# Patient Record
Sex: FEMALE
[De-identification: names, ages, dates, MRNs, and addresses within clinical notes are randomized per-mention and may not be internally consistent; named-entity substitution may affect disease eponyms.]

---

## 2018-11-05 ENCOUNTER — CHARTING TRANS (OUTPATIENT)
Dept: OTHER | Age: 23
End: 2018-11-05

## 2018-11-05 ENCOUNTER — LAB SERVICES (OUTPATIENT)
Dept: OTHER | Age: 23
End: 2018-11-05

## 2018-11-05 LAB
APPEARANCE: NORMAL
BILIRUBIN: NEGATIVE
COLOR: NORMAL
GLUCOSE U: NEGATIVE
KETONES: NEGATIVE
LEUKOCYTES: NORMAL
NITRITE: NEGATIVE
OCCULT BLOOD: NORMAL
PH: 6.5
PROTEIN: NEGATIVE
URINE SPEC GRAVITY: 1.01
UROBILINOGEN: NEGATIVE

## 2018-11-05 ASSESSMENT — PAIN SCALES - GENERAL: PAINLEVEL_OUTOF10: 5

## 2018-11-08 ENCOUNTER — CHARTING TRANS (OUTPATIENT)
Dept: OTHER | Age: 23
End: 2018-11-08

## 2018-11-08 LAB — BACTERIA UR CULT: NORMAL

## 2018-12-08 VITALS
RESPIRATION RATE: 18 BRPM | BODY MASS INDEX: 21.47 KG/M2 | TEMPERATURE: 98.7 F | WEIGHT: 125.78 LBS | HEART RATE: 78 BPM | HEIGHT: 64 IN

## 2025-02-06 ENCOUNTER — OFFICE VISIT (OUTPATIENT)
Dept: OBGYN CLINIC | Facility: CLINIC | Age: 30
End: 2025-02-06
Payer: COMMERCIAL

## 2025-02-06 VITALS
DIASTOLIC BLOOD PRESSURE: 58 MMHG | BODY MASS INDEX: 28 KG/M2 | HEIGHT: 63 IN | SYSTOLIC BLOOD PRESSURE: 102 MMHG | WEIGHT: 158 LBS

## 2025-02-06 DIAGNOSIS — N92.0 MENORRHAGIA WITH REGULAR CYCLE: ICD-10-CM

## 2025-02-06 DIAGNOSIS — Z31.89 ENCOUNTER FOR FERTILITY PLANNING: Primary | ICD-10-CM

## 2025-02-06 DIAGNOSIS — Z76.89 ENCOUNTER TO ESTABLISH CARE: ICD-10-CM

## 2025-02-06 DIAGNOSIS — Z12.4 SCREENING FOR CERVICAL CANCER: ICD-10-CM

## 2025-02-06 DIAGNOSIS — N94.6 DYSMENORRHEA: ICD-10-CM

## 2025-02-06 PROBLEM — R10.2 ABDOMINAL PAIN, SUPRAPUBIC: Status: ACTIVE | Noted: 2018-09-25

## 2025-02-06 PROBLEM — N76.4 VULVAR ABSCESS: Status: ACTIVE | Noted: 2018-02-07

## 2025-02-06 PROBLEM — R21 RASH: Status: ACTIVE | Noted: 2018-02-07

## 2025-02-06 PROBLEM — K58.9 IRRITABLE BOWEL SYNDROME: Status: ACTIVE | Noted: 2020-03-03

## 2025-02-06 PROBLEM — R30.0 DYSURIA: Status: ACTIVE | Noted: 2020-02-10

## 2025-02-06 PROCEDURE — 99214 OFFICE O/P EST MOD 30 MIN: CPT | Performed by: OBSTETRICS & GYNECOLOGY

## 2025-02-06 PROCEDURE — 99385 PREV VISIT NEW AGE 18-39: CPT | Performed by: OBSTETRICS & GYNECOLOGY

## 2025-02-06 PROCEDURE — 87491 CHLMYD TRACH DNA AMP PROBE: CPT | Performed by: OBSTETRICS & GYNECOLOGY

## 2025-02-06 PROCEDURE — 88175 CYTOPATH C/V AUTO FLUID REDO: CPT | Performed by: OBSTETRICS & GYNECOLOGY

## 2025-02-06 PROCEDURE — 87624 HPV HI-RISK TYP POOLED RSLT: CPT | Performed by: OBSTETRICS & GYNECOLOGY

## 2025-02-06 PROCEDURE — 87591 N.GONORRHOEAE DNA AMP PROB: CPT | Performed by: OBSTETRICS & GYNECOLOGY

## 2025-02-06 RX ORDER — IBUPROFEN 800 MG/1
800 TABLET, FILM COATED ORAL EVERY 8 HOURS PRN
Qty: 60 TABLET | Refills: 3 | Status: SHIPPED | OUTPATIENT
Start: 2025-02-06

## 2025-02-06 RX ORDER — TRANEXAMIC ACID 650 MG/1
1300 TABLET ORAL 3 TIMES DAILY
Qty: 30 TABLET | Refills: 6 | Status: SHIPPED | OUTPATIENT
Start: 2025-02-06

## 2025-02-07 LAB
C TRACH DNA SPEC QL NAA+PROBE: NEGATIVE
HPV E6+E7 MRNA CVX QL NAA+PROBE: NEGATIVE
N GONORRHOEA DNA SPEC QL NAA+PROBE: NEGATIVE

## 2025-02-07 NOTE — PROGRESS NOTES
New Patient GYN History and Physical  [facility]    CHIEF COMPLAINT:    Chief Complaint   Patient presents with    Menstrual Problem     Pt states very bad menstrual cycles    HISTORY OF PRESENT ILLNESS:   Nohelia Cavazos is a 29 year old female   who presents for  annual exam and some concerns.    Very bad periods    And trying to get pregnant for a year    Patient's last menstrual period was 2025.  Montly / regular, 4-5 days, first 2-3 days very heavy then tapers, pain starts with with the bleeding most of the time, and resolves   Ibuprofen helps    Does get pain with ovulation - hurts for a couple days - thinks its during ovulation.    Uses a period tracking cate.    Currently partner x 11 years (he is 29 yo) - not a smoker, not a heavy alcohol, no marijuana    Last pap smear - unsure, maybe 2018 normal  Remote h/o chlamydia treated with no complications    Had a pregnancy at 15 yo - EAB with D&C    Mood: sees a therapist, thinks she has ADHD but doesn't want medication      PAST MEDICAL HISTORY:   Past Medical History:    Anxiety        PAST SURGICAL HISTORY:   Past Surgical History:   Procedure Laterality Date    Patient denies any surgical history          PAST OB HISTORY:  OB History    Para Term  AB Living   1       1 0   SAB IAB Ectopic Multiple Live Births     1            # Outcome Date GA Lbr Shelton/2nd Weight Sex Type Anes PTL Lv   1 IAB                CURRENT MEDICATIONS:      Current Outpatient Medications:     ibuprofen 800 MG Oral Tab, Take 1 tablet (800 mg total) by mouth every 8 (eight) hours as needed for Pain., Disp: 60 tablet, Rfl: 3    Prenatal Vit-Fe Fumarate-FA (PRENATAL VITAMINS OR), Take by mouth., Disp: , Rfl:     MAGNESIUM GLYCINATE OR, Take 500 mg by mouth daily., Disp: , Rfl:     tranexamic acid 650 MG Oral Tab, Take 2 tablets (1,300 mg total) by mouth in the morning, at noon, and at bedtime. Take for either 5 days max, or stop when bleeding stops,  Disp: 30 tablet, Rfl: 6    ALLERGIES:  Allergies[1]    SOCIAL HISTORY:  Social History     Socioeconomic History    Marital status: Single   Tobacco Use    Smoking status: Never    Smokeless tobacco: Never   Substance and Sexual Activity    Alcohol use: Yes     Comment: 1-2 per week    Drug use: Never    Sexual activity: Yes   Other Topics Concern    Blood Transfusions No       FAMILY HISTORY:  Family History   Problem Relation Age of Onset    Hypertension Father     No Known Problems Mother     Kidney Cancer Maternal Grandmother     Diabetes Maternal Grandfather     Diabetes Paternal Grandmother      ASSESSMENTS:  PHYSICAL EXAM:   Patient's last menstrual period was 02/05/2025.   Vitals:    02/06/25 1756   BP: 102/58   Weight: 158 lb (71.7 kg)   Height: 63\"     CONSTITUTIONAL: Awake, alert, cooperative, no apparent distress, and appears stated age   NECK: Supple, symmetrical, trachea midline, no adenopathy, thyroid symmetric, not enlarged and no tenderness  LUNGS: No excess work of breathing  CHEST/BREASTS: Breasts symmetrical, skin without lesion(s), no nipple retraction or dimpling, no nipple discharge, no masses palpated, no axillary or supraclavicular adenopathy    ABDOMEN: Soft, non-distended, non-tender, no masses palpated    GENITAL/URINARY:    External Genitalia:  General appearance; normal, Hair distribution; normal, Lesions absent   Urethral Meatus:  Lesions absent, Prolapse absent  Bladder:  Tenderness absent, Cystocele absent  Vagina:  Discharge absent, Lesions absent, Pelvic support normal  Cervix:  Lesions absent, Discharge absent, Tenderness absent  Uterus:  Size normal, Masses absent, Tenderness absent  Adnexa:  Masses absent, Tenderness absent  Anus/Perineum:  Lesions absent    MUSCULOSKELETAL: There is no redness, warmth, or swelling of the joints. Tone is normal.  NEUROLOGIC: Patient is awake, alert and oriented to name, place and time. Casual gait is normal.  SKIN: no bruising or bleeding and no  kota  PSYCHIATRIC: Behavior:  Appropriate  Mood:  appropriate  ASSESSMENT AND PLAN:  1. Encounter for fertility planning  - will check labs, collected pap, get US. If all normal, can consider OI medications, vs fertility referral.  - TSH and Free T4; Future  - Prolactin; Future  - CBC, Platelet; No Differential; Future  - LH (Luteinizing Hormone); Future  - FSH; Future  - Estradiol; Future  - Chlamydia/GC PCR Combo; Future    2. Encounter to establish care  - Primary Care Physician Referral - In Network    3. Dysmenorrhea  - ibuprofen rx sent  - Pelvic US Complete GY[facility] Only [[id number]]; Future    4. Menorrhagia with regular cycle  - TXA rx sent  - Pelvic US Complete GYNE Only [[id number]]; Future    5. Screening for cervical cancer  - ThinPrep PAP Smear; Future  - Hpv Dna  High Risk , Thin Prep Collect; Future    30 minutes was spent reviewing diagnostic and management for additioal concerns above typical annual exam / pap smear review.  follow up as needed  Mary Adrian DO         [1]   Allergies  Allergen Reactions    Amoxicillin RASH    Sulfamethoxazole W/Trimethoprim RASH

## 2025-02-08 ENCOUNTER — LAB ENCOUNTER (OUTPATIENT)
Dept: LAB | Facility: HOSPITAL | Age: 30
End: 2025-02-08
Attending: OBSTETRICS & GYNECOLOGY
Payer: COMMERCIAL

## 2025-02-08 DIAGNOSIS — Z31.89 ENCOUNTER FOR FERTILITY PLANNING: ICD-10-CM

## 2025-02-08 LAB
DEPRECATED RDW RBC AUTO: 44.5 FL (ref 35.1–46.3)
ERYTHROCYTE [DISTWIDTH] IN BLOOD BY AUTOMATED COUNT: 13.2 % (ref 11–15)
ESTRADIOL SERPL-MCNC: 39.2 PG/ML
FSH SERPL-ACNC: 9.2 MIU/ML
HCT VFR BLD AUTO: 39.8 %
HGB BLD-MCNC: 13.2 G/DL
LH SERPL-ACNC: 5.8 MIU/ML
MCH RBC QN AUTO: 29.9 PG (ref 26–34)
MCHC RBC AUTO-ENTMCNC: 33.2 G/DL (ref 31–37)
MCV RBC AUTO: 90.2 FL
PLATELET # BLD AUTO: 306 10(3)UL (ref 150–450)
PROLACTIN SERPL-MCNC: 7 NG/ML
RBC # BLD AUTO: 4.41 X10(6)UL
T4 FREE SERPL-MCNC: 1 NG/DL (ref 0.8–1.7)
TSI SER-ACNC: 1.43 UIU/ML (ref 0.55–4.78)
WBC # BLD AUTO: 7.6 X10(3) UL (ref 4–11)

## 2025-02-08 PROCEDURE — 83002 ASSAY OF GONADOTROPIN (LH): CPT

## 2025-02-08 PROCEDURE — 36415 COLL VENOUS BLD VENIPUNCTURE: CPT

## 2025-02-08 PROCEDURE — 82670 ASSAY OF TOTAL ESTRADIOL: CPT

## 2025-02-08 PROCEDURE — 83001 ASSAY OF GONADOTROPIN (FSH): CPT

## 2025-02-08 PROCEDURE — 85027 COMPLETE CBC AUTOMATED: CPT

## 2025-02-08 PROCEDURE — 84146 ASSAY OF PROLACTIN: CPT

## 2025-02-08 PROCEDURE — 84443 ASSAY THYROID STIM HORMONE: CPT

## 2025-02-08 PROCEDURE — 84439 ASSAY OF FREE THYROXINE: CPT

## 2025-02-21 ENCOUNTER — ULTRASOUND ENCOUNTER (OUTPATIENT)
Dept: OBGYN CLINIC | Facility: CLINIC | Age: 30
End: 2025-02-21
Payer: COMMERCIAL

## 2025-02-21 DIAGNOSIS — N92.0 MENORRHAGIA WITH REGULAR CYCLE: ICD-10-CM

## 2025-02-21 DIAGNOSIS — N94.6 DYSMENORRHEA: ICD-10-CM

## 2025-06-23 ENCOUNTER — OFFICE VISIT (OUTPATIENT)
Dept: OBGYN CLINIC | Facility: CLINIC | Age: 30
End: 2025-06-23
Payer: COMMERCIAL

## 2025-06-23 VITALS
DIASTOLIC BLOOD PRESSURE: 72 MMHG | BODY MASS INDEX: 27.61 KG/M2 | SYSTOLIC BLOOD PRESSURE: 118 MMHG | HEIGHT: 63 IN | WEIGHT: 155.81 LBS

## 2025-06-23 DIAGNOSIS — Z32.01 PREGNANCY EXAMINATION OR TEST, POSITIVE RESULT (HCC): ICD-10-CM

## 2025-06-23 DIAGNOSIS — Z32.00 ENCOUNTER FOR CONFIRMATION OF PREGNANCY TEST RESULT WITH PHYSICAL EXAMINATION: Primary | ICD-10-CM

## 2025-06-23 LAB
CONTROL LINE PRESENT WITH A CLEAR BACKGROUND (YES/NO): YES YES/NO
KIT LOT #: NORMAL NUMERIC
PREGNANCY TEST, URINE: POSITIVE

## 2025-06-23 NOTE — PROGRESS NOTES
OFFICE VISIT FOR CONFIRMATION OF PREGNANCY    2025  3:09 PM    CC: Patient is here for confirmation of pregnancy.Missed period 2025. No usn yeat    HPI: Patient is a 29 year old  No LMP recorded (lmp unknown). Patient is pregnant. Unknown Estimated Date of Delivery: None noted. who presents for confirmation of pregnancy. +UCG , no ultrasound,  no vaginal bleeding, no lower abdominal pain, some nausea, rarevomiting. Pregnancy is planned. .    Attempting to get pregnant for 1.5 years. She had normal TV usn. Never saw DONNIE    Menses: regular,     OB History    Para Term  AB Living   2 0 0 0 1 0   SAB IAB Ectopic Multiple Live Births   0 1 0 0 0      # Outcome Date GA Lbr Shelton/2nd Weight Sex Type Anes PTL Lv   2 Current            1 IAB                GYN hx:         STI HX: none  LPS: UTD  Hx of abnormal paps: no  CONTRACEPTION: none  SEXUALLY ACTIVE: yes  CONDOM USE: no   HPV VACCINE no  LAST MAMMOGRAM: no      Past Medical History[1]  Past Surgical History[2]  Allergies[3]  Family History[4]  Social History     Socioeconomic History    Marital status: Single     Spouse name: Not on file    Number of children: Not on file    Years of education: Not on file    Highest education level: Not on file   Occupational History    Occupation:    Tobacco Use    Smoking status: Never    Smokeless tobacco: Never   Substance and Sexual Activity    Alcohol use: Not Currently     Alcohol/week: 1.0 standard drink of alcohol     Types: 1 Standard drinks or equivalent per week     Comment: 1-2 per week    Drug use: Never    Sexual activity: Yes   Other Topics Concern     Service Not Asked    Blood Transfusions No    Caffeine Concern No    Occupational Exposure Not Asked    Hobby Hazards Not Asked    Sleep Concern Not Asked    Stress Concern No    Weight Concern No    Special Diet No    Back Care Not Asked    Exercise Yes    Bike Helmet Not Asked    Seat Belt Yes    Self-Exams  Not Asked   Social History Narrative    Lives with juve    Feels safe    No hx of abuse     Social Drivers of Health     Food Insecurity: Not on file   Transportation Needs: Not on file   Stress: Not on file   Housing Stability: Not on file       Medications reviewed. See active list.     /72   Ht 63\"   Wt 155 lb 12.8 oz (70.7 kg)   LMP  (LMP Unknown)   BMI 27.60 kg/m²       Exam:   GENERAL: well developed, well nourished, in no apparent distress          A/P: Patient is 29 year old female  at unsure # of  weeks    1. Pregnancy examination or test, positive result (HCC)  - Urine Preg Test [[id number]]    2. Encounter for confirmation of pregnancy test result with physical examination  - US OB Transvaginal (any trimester) [[id number]]; Future      NOB written and verbal info given.  I dw pt the followin.) Well balanced diet with emphasis on iron rich and food high in calcium. I also recommended calcium and vitamin D supplementation, as well as DHA/omega3 fatty acids.  2.) Appropriate weight gain in pregnancy (20 - 30 lbs if normal weight, and 10 - 15 lbs if overweight)  3.) A minimum of 30 minutes per day of moderate exercise 4 times per week. Avoidance of high risk physical activity, and laying flat on her back with exercise.  4.) Mercury avoidance in certain fish (tuna, swordfish, mackerel).  5.) Listeria prevention with unpasteurized cheese and deli meats  6.) No raw meat or sea food.  7.) Caffeine intake limited to 1 cup of coffee per day, as well as discussing other foods containing caffeine.  8.) No illicit drugs, alcohol or smoking in pregnancy.  9.) Toxoplasmosis prevention and avoidance of hot tubs/saunas which increase core body temperature and may increase her fetus' risk of neural tube defects.   10.) Office and hospital testing and policies, including My Chart.  11.) Discussion of age related risk of trisomies and option of cell free DNA testing.  12.) The option of complete carrier  screening for autosomal recessive disease, cystic fibrosis screening, and SMA screening.    Plan FU in 4 weeks.    Jenny Jimenez MD              [1]   Past Medical History:   Anxiety    Dysmenorrhea    Since 6th grade   [2]   Past Surgical History:  Procedure Laterality Date    D & c  2012    DO NOT speak around anyone orher than just me please    Patient denies any surgical history     [3]   Allergies  Allergen Reactions    Amoxicillin RASH    Sulfamethoxazole W/Trimethoprim RASH   [4]   Family History  Problem Relation Age of Onset    No Known Problems Mother     Hypertension Father     Kidney Cancer Maternal Grandmother     Cancer Maternal Grandmother         Kidney Cancer    Diabetes Maternal Grandfather     Diabetes Paternal Grandmother     No Known Problems Sister     Breast Cancer Neg     Ovarian Cancer Neg     Colon Cancer Neg

## 2025-06-30 ENCOUNTER — ULTRASOUND ENCOUNTER (OUTPATIENT)
Dept: OBGYN CLINIC | Facility: CLINIC | Age: 30
End: 2025-06-30
Payer: COMMERCIAL

## 2025-06-30 DIAGNOSIS — O36.80X0 ENCOUNTER TO DETERMINE FETAL VIABILITY OF PREGNANCY, SINGLE OR UNSPECIFIED FETUS (HCC): Primary | ICD-10-CM

## 2025-06-30 PROBLEM — O30.049 DICHORIONIC DIAMNIOTIC TWIN GESTATION (HCC): Status: ACTIVE | Noted: 2025-06-30

## 2025-07-01 ENCOUNTER — LAB ENCOUNTER (OUTPATIENT)
Dept: LAB | Facility: HOSPITAL | Age: 30
End: 2025-07-01
Attending: OBSTETRICS & GYNECOLOGY
Payer: COMMERCIAL

## 2025-07-01 ENCOUNTER — NURSE ONLY (OUTPATIENT)
Dept: OBGYN CLINIC | Facility: CLINIC | Age: 30
End: 2025-07-01

## 2025-07-01 DIAGNOSIS — Z34.81 ENCOUNTER FOR SUPERVISION OF OTHER NORMAL PREGNANCY IN FIRST TRIMESTER (HCC): Primary | ICD-10-CM

## 2025-07-01 DIAGNOSIS — Z34.81 ENCOUNTER FOR SUPERVISION OF OTHER NORMAL PREGNANCY IN FIRST TRIMESTER (HCC): ICD-10-CM

## 2025-07-01 LAB
ANTIBODY SCREEN: NEGATIVE
BASOPHILS # BLD AUTO: 0.03 X10(3) UL (ref 0–0.2)
BASOPHILS NFR BLD AUTO: 0.3 %
DEPRECATED RDW RBC AUTO: 48.1 FL (ref 35.1–46.3)
EOSINOPHIL # BLD AUTO: 0.02 X10(3) UL (ref 0–0.7)
EOSINOPHIL NFR BLD AUTO: 0.2 %
ERYTHROCYTE [DISTWIDTH] IN BLOOD BY AUTOMATED COUNT: 14.9 % (ref 11–15)
EST. AVERAGE GLUCOSE BLD GHB EST-MCNC: 94 MG/DL (ref 68–126)
HBA1C MFR BLD: 4.9 % (ref ?–5.7)
HBV SURFACE AG SER-ACNC: 0.17 [IU]/L
HBV SURFACE AG SERPL QL IA: NONREACTIVE
HCT VFR BLD AUTO: 36.9 % (ref 35–48)
HCV AB SERPL QL IA: NONREACTIVE
HGB BLD-MCNC: 12.4 G/DL (ref 12–16)
IMM GRANULOCYTES # BLD AUTO: 0.03 X10(3) UL (ref 0–1)
IMM GRANULOCYTES NFR BLD: 0.3 %
LYMPHOCYTES # BLD AUTO: 1.79 X10(3) UL (ref 1–4)
LYMPHOCYTES NFR BLD AUTO: 16.5 %
MCH RBC QN AUTO: 29.5 PG (ref 26–34)
MCHC RBC AUTO-ENTMCNC: 33.6 G/DL (ref 31–37)
MCV RBC AUTO: 87.9 FL (ref 80–100)
MONOCYTES # BLD AUTO: 0.48 X10(3) UL (ref 0.1–1)
MONOCYTES NFR BLD AUTO: 4.4 %
NEUTROPHILS # BLD AUTO: 8.51 X10 (3) UL (ref 1.5–7.7)
NEUTROPHILS # BLD AUTO: 8.51 X10(3) UL (ref 1.5–7.7)
NEUTROPHILS NFR BLD AUTO: 78.3 %
PLATELET # BLD AUTO: 272 10(3)UL (ref 150–450)
RBC # BLD AUTO: 4.2 X10(6)UL (ref 3.8–5.3)
RH BLOOD TYPE: NEGATIVE
RUBV IGG SER QL: POSITIVE
RUBV IGG SER-ACNC: 68 IU/ML (ref 10–?)
T PALLIDUM AB SER QL IA: NONREACTIVE
WBC # BLD AUTO: 10.9 X10(3) UL (ref 4–11)

## 2025-07-01 PROCEDURE — 86762 RUBELLA ANTIBODY: CPT

## 2025-07-01 PROCEDURE — 87086 URINE CULTURE/COLONY COUNT: CPT

## 2025-07-01 PROCEDURE — 87389 HIV-1 AG W/HIV-1&-2 AB AG IA: CPT

## 2025-07-01 PROCEDURE — 86900 BLOOD TYPING SEROLOGIC ABO: CPT

## 2025-07-01 PROCEDURE — 83020 HEMOGLOBIN ELECTROPHORESIS: CPT

## 2025-07-01 PROCEDURE — 86803 HEPATITIS C AB TEST: CPT

## 2025-07-01 PROCEDURE — 87340 HEPATITIS B SURFACE AG IA: CPT

## 2025-07-01 PROCEDURE — 83036 HEMOGLOBIN GLYCOSYLATED A1C: CPT

## 2025-07-01 PROCEDURE — 86765 RUBEOLA ANTIBODY: CPT | Performed by: OBSTETRICS & GYNECOLOGY

## 2025-07-01 PROCEDURE — 85025 COMPLETE CBC W/AUTO DIFF WBC: CPT

## 2025-07-01 PROCEDURE — 83021 HEMOGLOBIN CHROMOTOGRAPHY: CPT

## 2025-07-01 PROCEDURE — 36415 COLL VENOUS BLD VENIPUNCTURE: CPT

## 2025-07-01 PROCEDURE — 86780 TREPONEMA PALLIDUM: CPT

## 2025-07-01 PROCEDURE — 86901 BLOOD TYPING SEROLOGIC RH(D): CPT

## 2025-07-01 PROCEDURE — 86850 RBC ANTIBODY SCREEN: CPT

## 2025-07-01 NOTE — PROGRESS NOTES
Pt is a G 2 P 0 here today for RN OB Education.       Pregnancy Confirmation apt with: Dr. Vega 25    LMP: 25    US: 25    Working KRZYSZTOF: 25    Pre  BMI: 27.46    Medical Hx significant for: Past Medical History    Diagnosis Date Comments   Anxiety [F41.9]     Dysmenorrhea [N94.6] 2007 Since 6th grade       Obstetrical Hx significant for: none    Surgical Hx significant for: D&C     EPDS score: 4    Early GTT screening: does not meet criteria    Preeclampsia prevention screening:does not meet criteria.     OUD Screening: Patient has answered NO to 5p questions and has no  risk factors.     Patient given \"What Pregnant Women Need to Know\" handout.     Educational material reviewed with patient: Prenatal care, nutrition, weight gain recommendations, travel, exercise, intercourse, pregnancy changes, safe medications, pregnancy and work, fetal movement, labor and  labor, warning signs, food safety, tdap, cord blood, breastfeeding, circumcision, and Group B strep.     Pt agrees to blood transfusion if needed: yes    PN labs ordered : yes    Optional genetic screening discussed.  Pt desires foresight and desires prequel     [facility] Media Policy: Reviewed and verbalized understanding.     NOB appt: 25 with Dr. Tolbert    Lab appt: lab at OP currently closed, pt to complete today at [facility]

## 2025-07-02 PROBLEM — Z23 NEED FOR PROPHYLACTIC VACCINATION WITH MEASLES-MUMPS-RUBELLA (MMR) VACCINE: Status: ACTIVE | Noted: 2025-07-02

## 2025-07-02 PROBLEM — O26.899 RH NEGATIVE STATE IN ANTEPARTUM PERIOD (HCC): Status: ACTIVE | Noted: 2025-07-02

## 2025-07-02 PROBLEM — Z67.91 RH NEGATIVE STATE IN ANTEPARTUM PERIOD (HCC): Status: ACTIVE | Noted: 2025-07-02

## 2025-07-02 LAB
HGB A2 MFR BLD: 2.7 % (ref 1.5–3.5)
HGB PNL BLD ELPH: 97.3 % (ref 95.5–100)
MEV IGG SER-ACNC: 11.4 AU/ML (ref 16.5–?)

## 2025-07-07 ENCOUNTER — TELEPHONE (OUTPATIENT)
Dept: OBGYN CLINIC | Facility: CLINIC | Age: 30
End: 2025-07-07

## 2025-07-10 ENCOUNTER — TELEPHONE (OUTPATIENT)
Dept: OBGYN CLINIC | Facility: CLINIC | Age: 30
End: 2025-07-10

## 2025-07-11 ENCOUNTER — TELEPHONE (OUTPATIENT)
Dept: OBGYN CLINIC | Facility: CLINIC | Age: 30
End: 2025-07-11

## 2025-07-11 PROBLEM — Z13.71 SCREENING FOR GENETIC DISEASE CARRIER STATUS: Status: ACTIVE | Noted: 2025-07-11

## 2025-07-23 ENCOUNTER — ROUTINE PRENATAL (OUTPATIENT)
Dept: OBGYN CLINIC | Facility: CLINIC | Age: 30
End: 2025-07-23

## 2025-07-23 VITALS
BODY MASS INDEX: 26.64 KG/M2 | DIASTOLIC BLOOD PRESSURE: 64 MMHG | SYSTOLIC BLOOD PRESSURE: 124 MMHG | WEIGHT: 150.38 LBS | HEIGHT: 63 IN

## 2025-07-23 DIAGNOSIS — O09.91 SUPERVISION OF HIGH RISK PREGNANCY IN FIRST TRIMESTER (HCC): Primary | ICD-10-CM

## 2025-07-23 DIAGNOSIS — O30.041 DICHORIONIC DIAMNIOTIC TWIN PREGNANCY IN FIRST TRIMESTER (HCC): ICD-10-CM

## 2025-07-23 PROCEDURE — 99213 OFFICE O/P EST LOW 20 MIN: CPT | Performed by: OBSTETRICS & GYNECOLOGY

## 2025-07-23 NOTE — PROGRESS NOTES
[facility]  Obstetrics and Gynecology  History & Physical    CC: Patient is here to establish prenatal care     Subjective:     HPI:  Nohelia Cavazos is a 29 year old  female at 12w6d who presents today to establish prenatal care. Patient reports some nausea with 1-2 vomiting episodes. Some round ligament like pain. No bleeding.     LMP: Patient's last menstrual period was 2025.  KRZYSZTOF:  Estimated Date of Delivery: 26    OB:  OB History    Para Term  AB Living   2 0 0 0 1 0   SAB IAB Ectopic Multiple Live Births   0 1 0 0 0      # Outcome Date GA Lbr Shelton/2nd Weight Sex Type Anes PTL Lv   2 Current            1 IAB               Obstetric Comments   Do not speak of previous OB history with family members in the room per patient request       GYN:   Pap hx- Last Pap 25: NILM, HPV negative.    PMH: Past Medical History[1]    PSH:  Past Surgical History[2]    MEDS:  Medications Ordered Prior to Encounter[3]    Allergies:    Allergies[4]    Immunizations:  Immunization History   Administered Date(s) Administered    DTP 1996, 04/15/1996, 1996, 10/13/1997    FLUZONE 6 months and older PFS 0.5 ml (10173) 2020    HEP B, Ped/Adol 1995, 1996, 1997    Hib, Unspecified Formulation 1996, 04/15/1996, 1996, 10/13/1997    IPV 1996, 04/15/1996, 10/13/1997    MMR 1997       Family Hx:    Family History[5]    SocialHx:      Short Social Hx on File[6]      Review of Systems:  General: no complaints  Eyes: no complaints  Respiratory: no complaints  Cardiovascular: no complaints  GI: See HPI   : See HPI  Hematological/lymphatic: no complaints  Breast: no complaints  Psychiatric: no complaints  Endocrine:no complaints  Neurological: no complaints  Immunological: no complaints  Musculoskeletal:no complaints    Objective:     Vitals:    25 0949   BP: 124/64   Weight: 150 lb 6.4 oz (68.2 kg)   Height: 63\"         Exam:   GENERAL: well developed, well nourished, in no apparent distress, alert and orientated X 3  PSYCH: mood and affect stable   SKIN: no rashes, no lesions  LUNGS: respiration unlabored  CARDIOVASCULAR: no peripheral edema or varicosities, skin warm and dry  ABDOMEN: Soft, non distended; non tender  EXTREMITIES:  Normal range of motion, strength 5/5 walking.     Fetal Well Being Assessment:    TAUS: CRL grossly consistent with established KRZYSZTOF X 2. Doppler Mode FHT  normal X 2 (153 and 159). D-Di twins.     Assessment/Plan:     Nohelia Cavazos is a 29 year old  female at 12w6d who presents today to establish prenatal care.    Problem List[7]      Prenatal care  - prenatal labs completed previously and within normal limits  - Pap: up to date  - continue PNV daily  - KAJAL in 3-4 weeks at 15 weeks or more.     Genetic Screening tests  - Discussion held with patient about genetic screening: Cell free DNA and amniocentesis. Discussed how cell free DNA does not screen for all genetic anomalies that could occur with the baby.   - Patient offered Amniocentesis and declined.   - Cell free DNA completed previously and normal X2.   - Myriad Carrier screening including Cystic fibrosis, SMA, and hemoglobinopathies: completed previously and negative.     Patient education  - Pt counseled on safety, diet, exercise, caffiene, tobacco, ETOH, sexual activity, ER precautions, diet, exercise, appropriate weight gain, travel, appropriate otc medication use, substance abuse and pets.  - Counseled on taking a PNV with 0.4mg of folic acid   - Limiting intake of alcohol, coffee, tea, soda, and other foods containing caffeine  - Limit intake of fish to twice weekly to limit mercury exposure  - Pregnancy BMI guidelines: Prepregnancy weight: 154 lbs. Weight today 150 lbs. BMI 27. Expected Weigh gain 15-25 lbs. TWG -4 lbs.    RTC in 3-4 weeks     Dr. Sean Tolbert MD    [facility]     This note was created by Dragon voice  recognition. Errors in content may be related to improper recognition by the system; efforts to review and correct have been done but errors may still exist. Please be advised the primary purpose of this note is for me to communicate medical care. Standard sentence structure is not always used. Medical terminology and medical abbreviations may be used. There may be grammatical, typographical, and automated fill ins that may have errors missed in proofreading.               [1]   Past Medical History:   Anxiety    Dysmenorrhea    Since 6th grade   [2]   Past Surgical History:  Procedure Laterality Date    D & c  2012    DO NOT speak around anyone orher than just me please    Patient denies any surgical history     [3]   Current Outpatient Medications on File Prior to Visit   Medication Sig Dispense Refill    Prenatal Vit-Fe Fumarate-FA (PRENATAL VITAMINS OR) Take by mouth.      ibuprofen 800 MG Oral Tab Take 1 tablet (800 mg total) by mouth every 8 (eight) hours as needed for Pain. (Patient not taking: Reported on 7/23/2025) 60 tablet 3    MAGNESIUM GLYCINATE OR Take 500 mg by mouth daily. (Patient not taking: Reported on 7/23/2025)      tranexamic acid 650 MG Oral Tab Take 2 tablets (1,300 mg total) by mouth in the morning, at noon, and at bedtime. Take for either 5 days max, or stop when bleeding stops (Patient not taking: Reported on 7/23/2025) 30 tablet 6     No current facility-administered medications on file prior to visit.   [4]   Allergies  Allergen Reactions    Amoxicillin RASH    Sulfamethoxazole W/Trimethoprim RASH   [5]   Family History  Problem Relation Age of Onset    Hypertension Father     No Known Problems Mother     Kidney Cancer Maternal Grandmother     Cancer Maternal Grandmother         Kidney Cancer    Diabetes Maternal Grandfather         type 2    Diabetes Paternal Grandmother         type 2    No Known Problems Paternal Grandfather     No Known Problems Sister     Other (Other) Brother          autism    Breast Cancer Neg     Ovarian Cancer Neg     Colon Cancer Neg    [6]   Social History  Socioeconomic History    Marital status: Single   Occupational History    Occupation:    Tobacco Use    Smoking status: Never    Smokeless tobacco: Never   Vaping Use    Vaping status: Never Used   Substance and Sexual Activity    Alcohol use: Not Currently     Alcohol/week: 1.0 standard drink of alcohol     Types: 1 Standard drinks or equivalent per week     Comment: 1-2 per week    Drug use: Never    Sexual activity: Yes   Other Topics Concern    Blood Transfusions No    Caffeine Concern No    Stress Concern No    Weight Concern No    Special Diet No    Exercise Yes    Seat Belt Yes   Social History Narrative    Lives with fiance    Feels safe    No hx of abuse     Social Drivers of Health     Food Insecurity: No Food Insecurity (7/1/2025)    NCSS - Food Insecurity     Worried About Running Out of Food in the Last Year: No     Ran Out of Food in the Last Year: No   Transportation Needs: No Transportation Needs (7/1/2025)    NCSS - Transportation     Lack of Transportation: No   Stress: No Stress Concern Present (7/1/2025)    Stress     Feeling of Stress : No   Housing Stability: Not At Risk (7/1/2025)    NCSS - Housing/Utilities     Has Housing: Yes     Worried About Losing Housing: No     Unable to Get Utilities: No   [7]   Patient Active Problem List  Diagnosis    Irritable bowel syndrome    Dichorionic diamniotic twin gestation (HCC)    Need for prophylactic vaccination with measles-mumps-rubella (MMR) vaccine    Rh negative state in antepartum period (HCC)    Foresight carrier screen- negative

## 2025-07-23 NOTE — PATIENT INSTRUCTIONS
Adapting to Pregnancy: First Trimester    As your body adjusts, you may have to change or limit your daily activities. You’ll need more rest. You may also need to use the energy you have more wisely.  Your changing body  Almost every part of your body is affected as you adapt to pregnancy. The uterus and cervix will begin to soften right away. You may not look very pregnant during the first 3 months. But you are likely to have some common signs of early pregnancy:  Nausea  Fatigue  Frequent urination  Mood swings  Bloating of the belly  Constipation  Heartburn  Missed or light periods (first trimester bleeding)  Nipple or breast tenderness and breast swelling  It’s not too late to start good habits  What matters most is protecting your baby from this moment on. If you smoke, drink alcohol, or use drugs, now is the time to stop. If you need help, talk with your healthcare provider:  Smoking increases the risk of stillbirth or having a low-birth-weight baby. If you smoke, quit now.  Alcohol and drugs have been linked with miscarriage, birth defects, intellectual disability, and low birth weight. Do not drink alcohol or take drugs.  Tips to relieve nausea  Although nausea can happen at any time of the day, it may be worse in the morning. To help prevent nausea:  Eat small, light meals at frequent intervals.  Drink fluids often.  Get up slowly. Eat a few unsalted crackers before you get out of bed.  Avoid smells that bother you.  Avoid spicy and fatty foods.  Eat an ice pop in your favorite flavor.  Get plenty of rest.  You can start taking xander or mint in all forms or vitamin B6 (maximum 200 mg throughout the day) and Unisom (12.5-25 mg) nightly for nausea and/or vomiting.  Talk with your healthcare provider if you take vitamins that upset your stomach.    Work concerns  The end of the first trimester is a good time to discuss working during pregnancy with your employer. Follow your healthcare provider’s advice if  your job needs you to stand for a long time, work with hazardous tools, or even sit at a desk all day. Your workspace, workload, or scheduled hours may need to be adjusted. Perhaps you can change body postures more often or take an extra break. It is also acceptable to work throughout your pregnancy until delivery.   Advice for travel  Talk to your healthcare provider first, but the second trimester may be the best time for any travel. You may be advised to avoid certain trips while you’re pregnant. Food and water can be concerns in developing countries. Travel by car is a good choice, as you can stop, get out, and stretch. Bring snacks and water along. Fasten the lap belt below your belly, low over your hips. Also be sure to wear the shoulder harness.  Intimacy  Unless your healthcare provider tells you to, there is no reason to stop having sex while you’re pregnant. You or your partner may notice changes in desire. Desire may be less in the first trimester, due to nausea and fatigue. In the second trimester, sex may be very enjoyable. The third trimester can be a challenge comfort-wise. Try different positions and see what’s best for you both.  [employer] last reviewed this educational content on 10/1/2017  © 3670-1737 [employer]. [address], [place] [address]. All rights reserved. This information is not intended as a substitute for professional medical care. Always follow your healthcare professional's instructions.

## 2025-07-30 ENCOUNTER — HOSPITAL ENCOUNTER (EMERGENCY)
Facility: HOSPITAL | Age: 30
Discharge: HOME | End: 2025-07-30
Attending: EMERGENCY MEDICINE

## 2025-07-30 ENCOUNTER — TELEPHONE (OUTPATIENT)
Dept: OBGYN CLINIC | Facility: CLINIC | Age: 30
End: 2025-07-30

## 2025-07-30 VITALS
HEART RATE: 80 BPM | TEMPERATURE: 99 F | DIASTOLIC BLOOD PRESSURE: 51 MMHG | SYSTOLIC BLOOD PRESSURE: 109 MMHG | OXYGEN SATURATION: 100 % | HEIGHT: 63 IN | WEIGHT: 155 LBS | RESPIRATION RATE: 21 BRPM | BODY MASS INDEX: 27.46 KG/M2

## 2025-07-30 DIAGNOSIS — T75.4XXA ELECTRICAL SHOCK OF HAND, INITIAL ENCOUNTER: Primary | ICD-10-CM

## 2025-07-30 LAB
ALBUMIN SERPL-MCNC: 4.5 G/DL (ref 3.2–4.8)
ALBUMIN/GLOB SERPL: 1.7 (ref 1–2)
ALP LIVER SERPL-CCNC: 56 U/L (ref 37–98)
ALT SERPL-CCNC: 20 U/L (ref 10–49)
ANION GAP SERPL CALC-SCNC: 9 MMOL/L (ref 0–18)
AST SERPL-CCNC: 25 U/L (ref ?–34)
ATRIAL RATE: 108 BPM
BASOPHILS # BLD AUTO: 0.02 X10(3) UL (ref 0–0.2)
BASOPHILS NFR BLD AUTO: 0.2 %
BILIRUB SERPL-MCNC: 0.8 MG/DL (ref 0.3–1.2)
BUN BLD-MCNC: 6 MG/DL (ref 9–23)
BUN/CREAT SERPL: 9.8 (ref 10–20)
CALCIUM BLD-MCNC: 9.6 MG/DL (ref 8.7–10.4)
CHLORIDE SERPL-SCNC: 103 MMOL/L (ref 98–112)
CK SERPL-CCNC: 31 U/L (ref 34–145)
CO2 SERPL-SCNC: 24 MMOL/L (ref 21–32)
CREAT BLD-MCNC: 0.61 MG/DL (ref 0.55–1.02)
DEPRECATED RDW RBC AUTO: 45.8 FL (ref 35.1–46.3)
EGFRCR SERPLBLD CKD-EPI 2021: 124 ML/MIN/1.73M2 (ref 60–?)
EOSINOPHIL # BLD AUTO: 0.03 X10(3) UL (ref 0–0.7)
EOSINOPHIL NFR BLD AUTO: 0.3 %
ERYTHROCYTE [DISTWIDTH] IN BLOOD BY AUTOMATED COUNT: 14.3 % (ref 11–15)
GLOBULIN PLAS-MCNC: 2.7 G/DL (ref 2–3.5)
GLUCOSE BLD-MCNC: 93 MG/DL (ref 70–99)
HCT VFR BLD AUTO: 37.4 % (ref 35–48)
HGB BLD-MCNC: 12.6 G/DL (ref 12–16)
IMM GRANULOCYTES # BLD AUTO: 0.03 X10(3) UL (ref 0–1)
IMM GRANULOCYTES NFR BLD: 0.3 %
LYMPHOCYTES # BLD AUTO: 2.06 X10(3) UL (ref 1–4)
LYMPHOCYTES NFR BLD AUTO: 19.7 %
MCH RBC QN AUTO: 29.7 PG (ref 26–34)
MCHC RBC AUTO-ENTMCNC: 33.7 G/DL (ref 31–37)
MCV RBC AUTO: 88.2 FL (ref 80–100)
MONOCYTES # BLD AUTO: 0.59 X10(3) UL (ref 0.1–1)
MONOCYTES NFR BLD AUTO: 5.6 %
NEUTROPHILS # BLD AUTO: 7.75 X10 (3) UL (ref 1.5–7.7)
NEUTROPHILS # BLD AUTO: 7.75 X10(3) UL (ref 1.5–7.7)
NEUTROPHILS NFR BLD AUTO: 73.9 %
OSMOLALITY SERPL CALC.SUM OF ELEC: 279 MOSM/KG (ref 275–295)
P AXIS: 76 DEGREES
P-R INTERVAL: 124 MS
PLATELET # BLD AUTO: 233 10(3)UL (ref 150–450)
POTASSIUM SERPL-SCNC: 3.6 MMOL/L (ref 3.5–5.1)
PROT SERPL-MCNC: 7.2 G/DL (ref 5.7–8.2)
Q-T INTERVAL: 320 MS
QRS DURATION: 80 MS
QTC CALCULATION (BEZET): 428 MS
R AXIS: 70 DEGREES
RBC # BLD AUTO: 4.24 X10(6)UL (ref 3.8–5.3)
SODIUM SERPL-SCNC: 136 MMOL/L (ref 136–145)
T AXIS: 21 DEGREES
TROPONIN I SERPL HS-MCNC: <3 NG/L (ref ?–34)
VENTRICULAR RATE: 108 BPM
WBC # BLD AUTO: 10.5 X10(3) UL (ref 4–11)

## 2025-07-30 PROCEDURE — 96360 HYDRATION IV INFUSION INIT: CPT

## 2025-07-30 PROCEDURE — 93010 ELECTROCARDIOGRAM REPORT: CPT

## 2025-07-30 PROCEDURE — 80053 COMPREHEN METABOLIC PANEL: CPT | Performed by: EMERGENCY MEDICINE

## 2025-07-30 PROCEDURE — 82550 ASSAY OF CK (CPK): CPT | Performed by: EMERGENCY MEDICINE

## 2025-07-30 PROCEDURE — 85025 COMPLETE CBC W/AUTO DIFF WBC: CPT | Performed by: EMERGENCY MEDICINE

## 2025-07-30 PROCEDURE — 93005 ELECTROCARDIOGRAM TRACING: CPT

## 2025-07-30 PROCEDURE — 99284 EMERGENCY DEPT VISIT MOD MDM: CPT

## 2025-07-30 PROCEDURE — 84484 ASSAY OF TROPONIN QUANT: CPT | Performed by: EMERGENCY MEDICINE

## 2025-08-14 ENCOUNTER — LAB ENCOUNTER (OUTPATIENT)
Dept: LAB | Facility: HOSPITAL | Age: 30
End: 2025-08-14
Attending: OBSTETRICS & GYNECOLOGY

## 2025-08-14 ENCOUNTER — ROUTINE PRENATAL (OUTPATIENT)
Dept: OBGYN CLINIC | Facility: CLINIC | Age: 30
End: 2025-08-14

## 2025-08-14 VITALS
WEIGHT: 151 LBS | SYSTOLIC BLOOD PRESSURE: 120 MMHG | BODY MASS INDEX: 26.75 KG/M2 | DIASTOLIC BLOOD PRESSURE: 70 MMHG | HEIGHT: 63 IN

## 2025-08-14 DIAGNOSIS — O30.042 DICHORIONIC DIAMNIOTIC TWIN PREGNANCY IN SECOND TRIMESTER (HCC): Primary | ICD-10-CM

## 2025-08-14 DIAGNOSIS — O30.042 DICHORIONIC DIAMNIOTIC TWIN PREGNANCY IN SECOND TRIMESTER (HCC): ICD-10-CM

## 2025-08-14 PROCEDURE — 36415 COLL VENOUS BLD VENIPUNCTURE: CPT

## 2025-08-14 PROCEDURE — 82105 ALPHA-FETOPROTEIN SERUM: CPT

## 2025-08-14 RX ORDER — ASPIRIN 81 MG/1
81 TABLET, CHEWABLE ORAL DAILY
Qty: 90 TABLET | Refills: 3 | Status: SHIPPED | OUTPATIENT
Start: 2025-08-14

## 2025-08-18 LAB
AFP MOM: 1.6
AFP VALUE: 51.9 NG/ML
GA ON COLL DATE: 16 WEEKS
INSULIN DEP AFP: NO
MAT AGE AT EDD: 30.1 YR
OSBR RISK 1 IN AFP: 4177
WEIGHT AFP: 155 LBS

## 2025-08-25 ENCOUNTER — TELEPHONE (OUTPATIENT)
Dept: OBGYN CLINIC | Facility: CLINIC | Age: 30
End: 2025-08-25